# Patient Record
Sex: MALE | Race: WHITE | NOT HISPANIC OR LATINO | Employment: OTHER | URBAN - METROPOLITAN AREA
[De-identification: names, ages, dates, MRNs, and addresses within clinical notes are randomized per-mention and may not be internally consistent; named-entity substitution may affect disease eponyms.]

---

## 2018-08-01 ENCOUNTER — HISTORICAL (OUTPATIENT)
Dept: ADMINISTRATIVE | Facility: HOSPITAL | Age: 83
End: 2018-08-01

## 2022-07-13 DIAGNOSIS — E03.9 HYPOTHYROIDISM, UNSPECIFIED TYPE: Primary | ICD-10-CM

## 2022-07-13 RX ORDER — LEVOTHYROXINE SODIUM 100 UG/1
100 TABLET ORAL EVERY MORNING
COMMUNITY
Start: 2022-04-14 | End: 2022-07-13 | Stop reason: SDUPTHER

## 2022-07-13 RX ORDER — LEVOTHYROXINE SODIUM 100 UG/1
100 TABLET ORAL EVERY MORNING
Qty: 90 TABLET | Refills: 1 | Status: SHIPPED | OUTPATIENT
Start: 2022-07-13 | End: 2022-08-03 | Stop reason: SDUPTHER

## 2022-08-03 DIAGNOSIS — E03.9 HYPOTHYROIDISM, UNSPECIFIED TYPE: ICD-10-CM

## 2022-08-03 DIAGNOSIS — I10 HYPERTENSION, UNSPECIFIED TYPE: Primary | ICD-10-CM

## 2022-08-03 RX ORDER — LOSARTAN POTASSIUM 50 MG/1
50 TABLET ORAL DAILY
COMMUNITY
End: 2022-08-03 | Stop reason: SDUPTHER

## 2022-08-03 RX ORDER — LEVOTHYROXINE SODIUM 100 UG/1
100 TABLET ORAL EVERY MORNING
Qty: 90 TABLET | Refills: 1 | Status: SHIPPED | OUTPATIENT
Start: 2022-08-03 | End: 2022-08-03 | Stop reason: CLARIF

## 2022-08-03 RX ORDER — LOSARTAN POTASSIUM 50 MG/1
50 TABLET ORAL DAILY
Qty: 90 TABLET | Refills: 1 | Status: SHIPPED | OUTPATIENT
Start: 2022-08-03 | End: 2023-01-25

## 2023-02-06 ENCOUNTER — TELEPHONE (OUTPATIENT)
Dept: INTERNAL MEDICINE | Facility: CLINIC | Age: 88
End: 2023-02-06
Payer: MEDICARE

## 2023-02-06 RX ORDER — ROSUVASTATIN CALCIUM 40 MG/1
40 TABLET, COATED ORAL DAILY
COMMUNITY
Start: 2022-12-12 | End: 2023-12-21 | Stop reason: ALTCHOICE

## 2023-02-06 RX ORDER — FINASTERIDE 5 MG/1
5 TABLET, FILM COATED ORAL DAILY
COMMUNITY
Start: 2023-01-11 | End: 2023-12-21

## 2023-02-06 NOTE — TELEPHONE ENCOUNTER
----- Message from Nicolasa Mei sent at 2/6/2023  8:59 AM CST -----  Regarding: call about chol meds  Daughter Debra wants to discuss patient chol meds with you. She says he is on two. Call her at [phone]

## 2023-03-30 DIAGNOSIS — Z79.899 ENCOUNTER FOR LONG-TERM (CURRENT) USE OF OTHER MEDICATIONS: ICD-10-CM

## 2023-03-30 DIAGNOSIS — R30.0 DYSURIA: ICD-10-CM

## 2023-03-30 DIAGNOSIS — E78.2 MIXED HYPERLIPIDEMIA: ICD-10-CM

## 2023-03-30 DIAGNOSIS — E55.9 VITAMIN D DEFICIENCY: ICD-10-CM

## 2023-03-30 DIAGNOSIS — Z00.00 WELLNESS EXAMINATION: Primary | ICD-10-CM

## 2023-03-30 DIAGNOSIS — E03.8 OTHER SPECIFIED HYPOTHYROIDISM: ICD-10-CM

## 2023-04-07 LAB
CHOLEST SERPL-MSCNC: 150 MG/DL (ref 0–200)
HDLC SERPL-MCNC: 57 MG/DL (ref 35–70)
LDLC SERPL CALC-MCNC: 70 MG/DL (ref 0–160)
TRIGL SERPL-MCNC: 114 MG/DL (ref 40–160)

## 2023-04-17 ENCOUNTER — TELEPHONE (OUTPATIENT)
Dept: INTERNAL MEDICINE | Facility: CLINIC | Age: 88
End: 2023-04-17
Payer: MEDICARE

## 2023-04-25 ENCOUNTER — TELEPHONE (OUTPATIENT)
Dept: INTERNAL MEDICINE | Facility: CLINIC | Age: 88
End: 2023-04-25
Payer: MEDICARE

## 2023-04-25 DIAGNOSIS — R53.1 GENERALIZED WEAKNESS: Primary | ICD-10-CM

## 2023-04-25 NOTE — TELEPHONE ENCOUNTER
Please call Back patient is daughter about this issue  When is his next regular scheduled appointment   Question about home health what criteria would he have her needing home health nursing

## 2023-04-25 NOTE — TELEPHONE ENCOUNTER
Spoke with Nia Sams pt daughter, concerns about memory loss, also concerns about pts thoughts and random statements. Daughter also concerned about pt not wanting to go to any doctors appt. Pt does not like leaving his home for anything. Daughter states that the pt is stating something about conspiracy and someone out to get him would like to know about blood work abnormalities. Should pt have HH nurse.

## 2023-04-25 NOTE — TELEPHONE ENCOUNTER
Pt had an appt on yesterday and refused to come, also had an appt with a different doctor and refused to attend appt. Daughter is wanting home health because she does not want pt to not be seen by a provider or have his health issues missed due to him not wanting to go to any appts. Also daughter stated due to pts size she is unable to make him do anything.

## 2023-04-25 NOTE — TELEPHONE ENCOUNTER
----- Message from Za Kendall sent at 4/25/2023  8:55 AM CDT -----  .Type:  Needs Medical Advice    Who Called: pt daughter   Symptoms (please be specific):    How long has patient had these symptoms:    Pharmacy name and phone #:    Would the patient rather a call back or a response via [facility]?   Best Call Back Number: [phone]  Additional Information: pt daughter asking for a call back to talk about dad health

## 2023-05-04 RX ORDER — ASPIRIN 81 MG/1
81 TABLET ORAL DAILY
COMMUNITY

## 2023-05-04 RX ORDER — LATANOPROST 50 UG/ML
1 SOLUTION/ DROPS OPHTHALMIC NIGHTLY
COMMUNITY
Start: 2023-03-27

## 2023-05-09 ENCOUNTER — OFFICE VISIT (OUTPATIENT)
Dept: VASCULAR SURGERY | Facility: CLINIC | Age: 88
End: 2023-05-09
Payer: MEDICARE

## 2023-05-09 VITALS
SYSTOLIC BLOOD PRESSURE: 147 MMHG | HEIGHT: 75 IN | DIASTOLIC BLOOD PRESSURE: 76 MMHG | BODY MASS INDEX: 27.35 KG/M2 | HEART RATE: 67 BPM | WEIGHT: 220 LBS

## 2023-05-09 DIAGNOSIS — I65.23 BILATERAL CAROTID ARTERY OCCLUSION: Primary | ICD-10-CM

## 2023-05-09 PROCEDURE — 99213 PR OFFICE/OUTPT VISIT, EST, LEVL III, 20-29 MIN: ICD-10-PCS | Mod: ,,, | Performed by: SPECIALIST

## 2023-05-09 PROCEDURE — 99213 OFFICE O/P EST LOW 20 MIN: CPT | Mod: ,,, | Performed by: SPECIALIST

## 2023-05-30 ENCOUNTER — TELEPHONE (OUTPATIENT)
Dept: INTERNAL MEDICINE | Facility: CLINIC | Age: 88
End: 2023-05-30
Payer: MEDICARE

## 2023-06-05 ENCOUNTER — OFFICE VISIT (OUTPATIENT)
Dept: INTERNAL MEDICINE | Facility: CLINIC | Age: 88
End: 2023-06-05
Payer: MEDICARE

## 2023-06-05 VITALS
BODY MASS INDEX: 27.15 KG/M2 | HEART RATE: 68 BPM | WEIGHT: 218.38 LBS | SYSTOLIC BLOOD PRESSURE: 136 MMHG | DIASTOLIC BLOOD PRESSURE: 74 MMHG | OXYGEN SATURATION: 99 % | HEIGHT: 75 IN

## 2023-06-05 DIAGNOSIS — M19.90 ARTHRITIS: Chronic | ICD-10-CM

## 2023-06-05 DIAGNOSIS — E03.9 HYPOTHYROIDISM, UNSPECIFIED TYPE: Chronic | ICD-10-CM

## 2023-06-05 DIAGNOSIS — Z00.00 WELLNESS EXAMINATION: Primary | ICD-10-CM

## 2023-06-05 DIAGNOSIS — E78.5 HYPERLIPIDEMIA, UNSPECIFIED HYPERLIPIDEMIA TYPE: Chronic | ICD-10-CM

## 2023-06-05 DIAGNOSIS — I10 ESSENTIAL (PRIMARY) HYPERTENSION: Chronic | ICD-10-CM

## 2023-06-05 PROCEDURE — G0439 PR MEDICARE ANNUAL WELLNESS SUBSEQUENT VISIT: ICD-10-PCS | Mod: ,,, | Performed by: INTERNAL MEDICINE

## 2023-06-05 PROCEDURE — G0439 PPPS, SUBSEQ VISIT: HCPCS | Mod: ,,, | Performed by: INTERNAL MEDICINE

## 2023-06-05 NOTE — PROGRESS NOTES
Brett Taveras MD        PATIENT NAME: Rajesh Quintanilla  : 1/15/1934  DATE: 23  MRN: [id number]      Patient Care Team:  Brett Taveras MD as PCP - General (Internal Medicine)  MD Chriss Bravo Jr., MD as Consulting Physician (Cardiology)       Billing Provider: Brett Taveras MD  Level of Service: [place] MEDIC[place] ANNUAL WELLNESS SUBSEQUENT VISIT  Patient PCP Information       Provider PCP Type    Brett Taveras MD General            Reason for Visit / Chief Complaint: Medicare AWV (Wellness/)       Update PCP  Update Chief Complaint         History of Present Illness / Problem Focused Workflow     Rajesh Quintanilla presents to the clinic with Medicare AWV (Wellness/)     Brigitte is here for his annual medical wellness exam   He is doing well with no issues   Slight memory problems  He has had a fall a 2 with no injuries.      Review of Systems   Review of Systems   Constitutional: Negative.    HENT: Negative.     Eyes: Negative.    Respiratory: Negative.     Cardiovascular: Negative.    Gastrointestinal: Negative.    Endocrine: Negative.    Genitourinary: Negative.    Musculoskeletal: Negative.    Integumentary:  Negative.   Neurological: Negative.    Psychiatric/Behavioral: Negative.        Patient Reported Health Risk Assessment       Medical / Social / Family History     Past Medical History:   Diagnosis Date    Arthritis     Essential (primary) hypertension     Hyperlipidemia     Hypothyroidism, unspecified        History reviewed. No pertinent surgical history.    Social History  Mr. Quintanilla  reports that he has quit smoking. His smoking use included cigarettes. He has never used smokeless tobacco. He reports current alcohol use. He reports that he does not use drugs.    Family History  Mr.'s Quintanilla  family history includes Heart disease in his mother; Stroke in his father.        Medications and Allergies     Medications  Outpatient Medications Marked as Taking for the 23  encounter (Office Visit) with Brett Taveras MD   Medication Sig Dispense Refill    aspirin (ECOTRIN) 81 MG EC tablet Take 81 mg by mouth once daily.      latanoprost 0.005 % ophthalmic solution Place 1 drop into both eyes every evening.      losartan (COZAAR) 50 MG tablet TAKE 1 TABLET BY MOUTH ONCE DAILY 90 tablet 1    rosuvastatin (CRESTOR) 40 MG Tab Take 40 mg by mouth Daily.         Allergies  Review of patient's allergies indicates:  No Known Allergies    Physical Examination     Vitals:    06/05/23 0955   BP: 136/74   Pulse: 68     Physical Exam  Vitals reviewed.   Constitutional:       Appearance: Normal appearance.   HENT:      Head: Normocephalic.      Right Ear: Tympanic membrane normal.      Left Ear: Tympanic membrane normal.      Nose: Nose normal.      Mouth/Throat:      Pharynx: Oropharynx is clear.   Eyes:      Extraocular Movements: Extraocular movements intact.      Pupils: Pupils are equal, round, and reactive to light.   Cardiovascular:      Rate and Rhythm: Normal rate and regular rhythm.      Pulses: Normal pulses.      Heart sounds: Normal heart sounds.   Pulmonary:      Effort: Pulmonary effort is normal.      Breath sounds: Normal breath sounds.   Abdominal:      General: Abdomen is flat. Bowel sounds are normal.      Palpations: Abdomen is soft.   Musculoskeletal:         General: Normal range of motion.      Cervical back: Normal range of motion.   Skin:     General: Skin is warm and dry.   Neurological:      General: No focal deficit present.      Mental Status: He is alert and oriented to person, place, and time.   Psychiatric:         Mood and Affect: Mood normal.         Behavior: Behavior normal.        No flowsheet data found.  Fall Risk Assessment - Outpatient 6/5/2023 5/9/2023   Mobility Status Ambulatory Ambulatory   Number of falls 0 0   Identified as fall risk 0 0                Assessment and Plan (including Health Maintenance)      Problem List  Smart Sets  Document  Outside HM   :    Plan:   Wellness examination    Hyperlipidemia, unspecified hyperlipidemia type    Hypothyroidism, unspecified type    Arthritis    Essential (primary) hypertension       All labs stable   Continue medication   Revisit 1 year annual wellness.         Health Maintenance Due   Topic Date Due    TETANUS VACCINE  Never done    Shingles Vaccine (1 of 2) Never done    Pneumococcal Vaccines (Age 65+) (1 - PCV) Never done    COVID-19 Vaccine (4 - Mixed Product series) 02/13/2022       Problem List Items Addressed This Visit          Cardiac/Vascular    Essential (primary) hypertension (Chronic)    Hyperlipidemia (Chronic)       Endocrine    Hypothyroidism, unspecified (Chronic)       Orthopedic    Arthritis (Chronic)     Other Visit Diagnoses       Wellness examination    -  Primary            Health Maintenance Topics with due status: Not Due       Topic Last Completion Date    Influenza Vaccine 12/06/2021    Lipid Panel 04/07/2023       Future Appointments   Date Time Provider Department Center   5/9/2024  9:00 AM CV [facility] VASCULAR SURGERY  [address] [facility]   5/9/2024  9:30 AM Page Hernandez MD [facility] [place] Annual Wellness and Personalized Prevention Plan:   Fall Risk + Home Safety + Hearing Impairment + Depression Screen + Cognitive Impairment Screen + Health Risk Assessment all reviewed.         Advance Care Planning   I attest to discussing Advance Care Planning with patient and/or family member.  Education was provided including the importance of the Health Care Power of , Advance Directives, and/or [place] documentation.  The patient expressed understanding to the importance of this information and discussion.       Opioid Screening: Patient medication list reviewed, patient is not taking prescription opioids. Patient is not using additional opioids than prescribed. Patient is at low risk of substance abuse based on this opioid use history.         Signature:  Brett Lowery MD  [facility]  [address]  [place] [address]    Date of encounter: 6/5/23    Follow up in about 1 year (around 6/5/2024) for Medicare Wellness with labs. In addition to their scheduled follow up, the patient has also been instructed to follow up on as needed basis.

## 2023-07-10 ENCOUNTER — TELEPHONE (OUTPATIENT)
Dept: INTERNAL MEDICINE | Facility: CLINIC | Age: 88
End: 2023-07-10
Payer: MEDICARE

## 2023-07-10 DIAGNOSIS — F03.90 DEMENTIA, UNSPECIFIED DEMENTIA SEVERITY, UNSPECIFIED DEMENTIA TYPE, UNSPECIFIED WHETHER BEHAVIORAL, PSYCHOTIC, OR MOOD DISTURBANCE OR ANXIETY: Primary | ICD-10-CM

## 2023-07-10 NOTE — TELEPHONE ENCOUNTER
----- Message from Randi Louie sent at 7/7/2023  8:12 AM CDT -----  .Type:  Needs Medical Advice    Who Called: pt daughter   Symptoms (please be specific): memory loss   How long has patient had these symptoms:  6 months   Pharmacy name and phone #:  [place] [facility] [place]  Would the patient rather a call back or a response via [facility]? Call back   Best Call Back Number: [phone]  Additional Information: pt daughters is requesting in home care for memory loss need call back

## 2023-07-10 NOTE — TELEPHONE ENCOUNTER
Pt daughter called and stated that his dementia is getting worse she stated he's more confused, she would like to know if home health would be able to go out and help him with strategies to help improve memory. Please Advise....

## 2023-07-11 DIAGNOSIS — R41.3 MEMORY CHANGES: Primary | ICD-10-CM

## 2023-07-11 NOTE — TELEPHONE ENCOUNTER
Yes we could refer to Home Health please see if [facility] goes to [place] and they could be consulted.

## 2023-07-11 NOTE — TELEPHONE ENCOUNTER
Called and spoke with daughter. Let her know referral and information was sent. Gave her phone number also. [phone]

## 2023-07-25 DIAGNOSIS — I10 HYPERTENSION, UNSPECIFIED TYPE: ICD-10-CM

## 2023-07-25 RX ORDER — LOSARTAN POTASSIUM 50 MG/1
TABLET ORAL
Qty: 90 TABLET | Refills: 1 | Status: SHIPPED | OUTPATIENT
Start: 2023-07-25 | End: 2024-01-29

## 2023-08-22 ENCOUNTER — TELEPHONE (OUTPATIENT)
Dept: INTERNAL MEDICINE | Facility: CLINIC | Age: 88
End: 2023-08-22
Payer: MEDICARE

## 2023-08-22 NOTE — TELEPHONE ENCOUNTER
----- Message from Za Kendall sent at 8/22/2023  1:45 PM CDT -----  .Type:  Needs Medical Advice    Who Called: pt daughter  Symptoms (please be specific):    How long has patient had these symptoms:    Pharmacy name and phone #:    Would the patient rather a call back or a response via [facility]?   Best Call Back Number:   Additional Information: the cardiologist asking for his recent labs Dr. Chriss Fragoso please fax to [facility]

## 2023-11-21 ENCOUNTER — TELEPHONE (OUTPATIENT)
Dept: INTERNAL MEDICINE | Facility: CLINIC | Age: 88
End: 2023-11-21
Payer: MEDICARE

## 2023-11-21 DIAGNOSIS — N39.0 URINARY TRACT INFECTION WITHOUT HEMATURIA, SITE UNSPECIFIED: Primary | ICD-10-CM

## 2023-11-21 LAB
APPEARANCE UR: CLEAR
BACTERIA #/AREA URNS HPF: NORMAL /[HPF]
BILIRUB UR QL STRIP: NEGATIVE
COLOR UR: YELLOW
CRYSTALS URNS MICRO: NORMAL
EPI CELLS #/AREA URNS HPF: NORMAL /HPF (ref 0–10)
GLUCOSE UR QL STRIP: NEGATIVE
HGB UR QL STRIP: NEGATIVE
KETONES UR QL STRIP: NEGATIVE
LEUKOCYTE ESTERASE UR QL STRIP: NEGATIVE
MICRO URNS: NORMAL
MICRO URNS: NORMAL
NITRITE UR QL STRIP: NEGATIVE
PH UR STRIP: 5.5 [PH] (ref 5–7.5)
PROT UR QL STRIP: NORMAL
RBC #/AREA URNS HPF: NORMAL /HPF (ref 0–2)
SP GR UR STRIP: 1.02 (ref 1–1.03)
URINALYSIS REFLEX: NORMAL
UROBILINOGEN UR STRIP-MCNC: 1 MG/DL (ref 0.2–1)
WBC #/AREA URNS HPF: NORMAL /HPF (ref 0–5)

## 2023-11-21 NOTE — TELEPHONE ENCOUNTER
----- Message from Almaz Chery sent at 11/21/2023  8:08 AM CST -----  Regarding: Medical Advice  Type:  Needs Medical Advice    Who Called: Russ  Symptoms (please be specific): confusion, delusion....think possible UTI   How long has patient had these symptoms:  2days  Pharmacy name and phone #:    Would the patient rather a call back or a response via [facility]? Call back   Best Call Back Number: [phone]  Additional Information: Russ is requesting a call back to discuss Jovan

## 2023-11-21 NOTE — TELEPHONE ENCOUNTER
Spoke with pt daughter Russ and she's stating that pt has been increased confusion she stated pt may have a UTI, orders placed for U/A and faxed to [facility] in [place].

## 2023-11-27 ENCOUNTER — TELEPHONE (OUTPATIENT)
Dept: INTERNAL MEDICINE | Facility: CLINIC | Age: 88
End: 2023-11-27
Payer: MEDICARE

## 2023-11-27 DIAGNOSIS — I10 ESSENTIAL (PRIMARY) HYPERTENSION: Chronic | ICD-10-CM

## 2023-11-27 DIAGNOSIS — E78.5 HYPERLIPIDEMIA, UNSPECIFIED HYPERLIPIDEMIA TYPE: Chronic | ICD-10-CM

## 2023-11-27 DIAGNOSIS — Z00.00 WELLNESS EXAMINATION: Primary | ICD-10-CM

## 2023-11-27 NOTE — TELEPHONE ENCOUNTER
Spoke with Russ about his change in behavior level of consciousness  Began after a dental procedure with minor anesthesia   Suspect worsening of dementia or possibly TIA CVA   Blood work scheduled   MRI scheduled   Please schedule patient to see Johnson within the week for follow-up.

## 2023-11-27 NOTE — TELEPHONE ENCOUNTER
Pt daughter would like to discuss with you his health issues with him having dementia, daughter Russ would like to discuss with you without pt because pt gets very upset. She wants to know if there would be another way she can speak with you without letting her dad know just to get in sight and to go into detail .Please Advise......  Russ [phone] Pt daughter.

## 2023-11-27 NOTE — TELEPHONE ENCOUNTER
----- Message from Jeanette Horner sent at 11/27/2023  8:16 AM CST -----  .Type:  Patient Returning Call    Who Called:pt daughter Russ  Who Left Message for Patient:  Does the patient know what this is regarding?:discuss with doctor changes in the pt   Would the patient rather a call back or a response via [facility]? Call back   Best Call Back Number:[phone]  Additional Information: Pt's daughter states that she got the results back from the urine test and they were normal. She states that she wants to set up a meeting with the doctor to talk about the next step because she thinks that her dad might have a case of dementia.

## 2023-11-28 ENCOUNTER — TELEPHONE (OUTPATIENT)
Dept: INTERNAL MEDICINE | Facility: CLINIC | Age: 88
End: 2023-11-28
Payer: MEDICARE

## 2023-11-28 DIAGNOSIS — R41.3 OTHER AMNESIA: Primary | ICD-10-CM

## 2023-11-28 DIAGNOSIS — I10 ESSENTIAL (PRIMARY) HYPERTENSION: Chronic | ICD-10-CM

## 2023-11-28 DIAGNOSIS — E78.5 HYPERLIPIDEMIA, UNSPECIFIED HYPERLIPIDEMIA TYPE: Chronic | ICD-10-CM

## 2023-11-28 DIAGNOSIS — R41.3 MEMORY CHANGES: ICD-10-CM

## 2023-11-28 NOTE — TELEPHONE ENCOUNTER
----- Message from Randi Louie sent at 11/28/2023  2:44 PM CST -----  .Type:  Needs Medical Advice    Who Called: pt daughter  Russ   Symptoms (please be specific):    How long has patient had these symptoms:    Pharmacy name and phone #:    Would the patient rather a call back or a response via [facility]? Please call once it's sent over   Best Call Back Number: [phone]  Additional Information: pt daughter  is waiting to schedule her dads MRI , [facility] haven't received the orders yet

## 2023-11-28 NOTE — TELEPHONE ENCOUNTER
----- Message from Jeanette Horner sent at 11/28/2023 10:33 AM CST -----  .Type:  Patient Returning Call    Who Called:pt's daughter grant   Who Left Message for Patient:  Does the patient know what this is regarding?:scheduling mri   Would the patient rather a call back or a response via [facility]? Call back  Best Call Back Number:[phone]  Additional Information: pt's daughter states to please call her phone number when scheduling the mri   [phone]

## 2023-11-28 NOTE — TELEPHONE ENCOUNTER
Spoke with daughter she is calling [employer] to give them phone number to call her. He will not answer phone

## 2023-11-28 NOTE — TELEPHONE ENCOUNTER
Spoke with daughter. Sera will order the MRI and contact Dr. Taveras to see what labs he wants ordered.

## 2023-11-29 ENCOUNTER — LAB VISIT (OUTPATIENT)
Dept: LAB | Facility: HOSPITAL | Age: 88
End: 2023-11-29
Attending: INTERNAL MEDICINE
Payer: MEDICARE

## 2023-11-29 DIAGNOSIS — I10 ESSENTIAL (PRIMARY) HYPERTENSION: ICD-10-CM

## 2023-11-29 DIAGNOSIS — R41.3 MEMORY CHANGES: ICD-10-CM

## 2023-11-29 DIAGNOSIS — E78.5 HYPERLIPIDEMIA, UNSPECIFIED HYPERLIPIDEMIA TYPE: ICD-10-CM

## 2023-11-29 DIAGNOSIS — R41.3 OTHER AMNESIA: ICD-10-CM

## 2023-11-29 LAB
ALBUMIN SERPL-MCNC: 4.1 G/DL (ref 3.4–4.8)
ALBUMIN/GLOB SERPL: 1.4 RATIO (ref 1.1–2)
ALP SERPL-CCNC: 40 UNIT/L (ref 40–150)
ALT SERPL-CCNC: 23 UNIT/L (ref 0–55)
AST SERPL-CCNC: 27 UNIT/L (ref 5–34)
BASOPHILS # BLD AUTO: 0.03 X10(3)/MCL
BASOPHILS NFR BLD AUTO: 0.4 %
BILIRUB SERPL-MCNC: 0.7 MG/DL
BUN SERPL-MCNC: 17.7 MG/DL (ref 8.4–25.7)
CALCIUM SERPL-MCNC: 9.2 MG/DL (ref 8.8–10)
CHLORIDE SERPL-SCNC: 106 MMOL/L (ref 98–107)
CHOLEST SERPL-MCNC: 147 MG/DL
CHOLEST/HDLC SERPL: 3 {RATIO} (ref 0–5)
CO2 SERPL-SCNC: 27 MMOL/L (ref 23–31)
CREAT SERPL-MCNC: 1.06 MG/DL (ref 0.73–1.18)
EOSINOPHIL # BLD AUTO: 0.13 X10(3)/MCL (ref 0–0.9)
EOSINOPHIL NFR BLD AUTO: 1.9 %
ERYTHROCYTE [DISTWIDTH] IN BLOOD BY AUTOMATED COUNT: 13.1 % (ref 11.5–17)
ERYTHROCYTE [SEDIMENTATION RATE] IN BLOOD: 29 MM/HR (ref 0–15)
GFR SERPLBLD CREATININE-BSD FMLA CKD-EPI: >60 MLS/MIN/1.73/M2
GLOBULIN SER-MCNC: 2.9 GM/DL (ref 2.4–3.5)
GLUCOSE SERPL-MCNC: 101 MG/DL (ref 82–115)
HCT VFR BLD AUTO: 44.5 % (ref 42–52)
HDLC SERPL-MCNC: 56 MG/DL (ref 35–60)
HGB BLD-MCNC: 14.4 G/DL (ref 14–18)
IMM GRANULOCYTES # BLD AUTO: 0.02 X10(3)/MCL (ref 0–0.04)
IMM GRANULOCYTES NFR BLD AUTO: 0.3 %
LDLC SERPL CALC-MCNC: 74 MG/DL (ref 50–140)
LYMPHOCYTES # BLD AUTO: 1.08 X10(3)/MCL (ref 0.6–4.6)
LYMPHOCYTES NFR BLD AUTO: 15.4 %
MCH RBC QN AUTO: 31.5 PG (ref 27–31)
MCHC RBC AUTO-ENTMCNC: 32.4 G/DL (ref 33–36)
MCV RBC AUTO: 97.4 FL (ref 80–94)
MONOCYTES # BLD AUTO: 0.75 X10(3)/MCL (ref 0.1–1.3)
MONOCYTES NFR BLD AUTO: 10.7 %
NEUTROPHILS # BLD AUTO: 5.01 X10(3)/MCL (ref 2.1–9.2)
NEUTROPHILS NFR BLD AUTO: 71.3 %
NRBC BLD AUTO-RTO: 0 %
PLATELET # BLD AUTO: 163 X10(3)/MCL (ref 130–400)
PMV BLD AUTO: 10.7 FL (ref 7.4–10.4)
POTASSIUM SERPL-SCNC: 4.6 MMOL/L (ref 3.5–5.1)
PROT SERPL-MCNC: 7 GM/DL (ref 5.8–7.6)
RBC # BLD AUTO: 4.57 X10(6)/MCL (ref 4.7–6.1)
SODIUM SERPL-SCNC: 138 MMOL/L (ref 136–145)
TRIGL SERPL-MCNC: 85 MG/DL (ref 34–140)
VLDLC SERPL CALC-MCNC: 17 MG/DL
WBC # SPEC AUTO: 7.02 X10(3)/MCL (ref 4.5–11.5)

## 2023-11-29 PROCEDURE — 85025 COMPLETE CBC W/AUTO DIFF WBC: CPT

## 2023-11-29 PROCEDURE — 85652 RBC SED RATE AUTOMATED: CPT

## 2023-11-29 PROCEDURE — 36415 COLL VENOUS BLD VENIPUNCTURE: CPT

## 2023-11-29 PROCEDURE — 80053 COMPREHEN METABOLIC PANEL: CPT

## 2023-11-29 PROCEDURE — 80061 LIPID PANEL: CPT

## 2023-11-29 NOTE — TELEPHONE ENCOUNTER
----- Message from Idania Foss sent at 11/29/2023  8:11 AM CST -----  Regarding: Contact #  Good Morning  May I please get a cell number for Dr. Taveras should the radiologist need to consult with him on this STAT MRI.  The patient is coming in today.  Thanks

## 2023-11-30 ENCOUNTER — TELEPHONE (OUTPATIENT)
Dept: INTERNAL MEDICINE | Facility: CLINIC | Age: 88
End: 2023-11-30
Payer: MEDICARE

## 2023-11-30 NOTE — TELEPHONE ENCOUNTER
All laboratory reports were stable   As report earlier the MRI is fine.  Please relay these results to the patient in his daughter

## 2023-12-04 ENCOUNTER — TELEPHONE (OUTPATIENT)
Dept: INTERNAL MEDICINE | Facility: CLINIC | Age: 88
End: 2023-12-04
Payer: MEDICARE

## 2023-12-04 NOTE — TELEPHONE ENCOUNTER
----- Message from Raza Fajardo sent at 12/4/2023 12:49 PM CST -----  .Type:  Needs Medical Advice    Who Called: rebeca Solano's daughter   Symptoms (please be specific):    How long has patient had these symptoms:    Pharmacy name and phone #:    Would the patient rather a call back or a response via [facility]?   Best Call Back Number: [phone]  Additional Information: Requested a call back to speak with the nurse.

## 2023-12-13 ENCOUNTER — TELEPHONE (OUTPATIENT)
Dept: INTERNAL MEDICINE | Facility: CLINIC | Age: 88
End: 2023-12-13
Payer: MEDICARE

## 2023-12-21 ENCOUNTER — OFFICE VISIT (OUTPATIENT)
Dept: INTERNAL MEDICINE | Facility: CLINIC | Age: 88
End: 2023-12-21
Payer: MEDICARE

## 2023-12-21 VITALS
HEIGHT: 75 IN | HEART RATE: 75 BPM | OXYGEN SATURATION: 96 % | DIASTOLIC BLOOD PRESSURE: 74 MMHG | WEIGHT: 207 LBS | SYSTOLIC BLOOD PRESSURE: 120 MMHG | BODY MASS INDEX: 25.74 KG/M2

## 2023-12-21 DIAGNOSIS — Z23 FLU VACCINE NEED: Primary | ICD-10-CM

## 2023-12-21 DIAGNOSIS — E78.2 MIXED HYPERLIPIDEMIA: Chronic | ICD-10-CM

## 2023-12-21 DIAGNOSIS — F03.90 DEMENTIA, UNSPECIFIED DEMENTIA SEVERITY, UNSPECIFIED DEMENTIA TYPE, UNSPECIFIED WHETHER BEHAVIORAL, PSYCHOTIC, OR MOOD DISTURBANCE OR ANXIETY: ICD-10-CM

## 2023-12-21 PROCEDURE — 90694 VACC AIIV4 NO PRSRV 0.5ML IM: CPT | Mod: ,,, | Performed by: INTERNAL MEDICINE

## 2023-12-21 PROCEDURE — 90694 FLU VACCINE - QUADRIVALENT - ADJUVANTED: ICD-10-PCS | Mod: ,,, | Performed by: INTERNAL MEDICINE

## 2023-12-21 PROCEDURE — 99214 OFFICE O/P EST MOD 30 MIN: CPT | Mod: 25,,, | Performed by: INTERNAL MEDICINE

## 2023-12-21 PROCEDURE — G0008 FLU VACCINE - QUADRIVALENT - ADJUVANTED: ICD-10-PCS | Mod: ,,, | Performed by: INTERNAL MEDICINE

## 2023-12-21 PROCEDURE — G0008 ADMIN INFLUENZA VIRUS VAC: HCPCS | Mod: ,,, | Performed by: INTERNAL MEDICINE

## 2023-12-21 PROCEDURE — 99214 PR OFFICE/OUTPT VISIT, EST, LEVL IV, 30-39 MIN: ICD-10-PCS | Mod: 25,,, | Performed by: INTERNAL MEDICINE

## 2023-12-21 RX ORDER — MEMANTINE HYDROCHLORIDE 5 MG/1
5 TABLET ORAL 2 TIMES DAILY
Qty: 60 TABLET | Refills: 11 | Status: SHIPPED | OUTPATIENT
Start: 2023-12-21 | End: 2024-12-20

## 2023-12-21 NOTE — ASSESSMENT & PLAN NOTE
Begin Namenda 5 mg bid  Referral to neurology  Revisit 3 months  Mm E done he scored 12/30   Long discussion with daughters about dementia and what we have in store for further evaluation.

## 2023-12-21 NOTE — PROGRESS NOTES
Brett Taveras MD        PATIENT NAME: Rajesh Quintanilla  : 1/15/1934  DATE: 23  MRN: [id number]      Billing Provider: Brett Taveras MD  Level of Service: [place] OFFICE/OUTPT VISIT, EST, LEVL IV, 30-39 MIN  Patient PCP Information       Provider PCP Type    Brett Taveras MD General            Reason for Visit / Chief Complaint: Follow-up (Dementia/)       Update PCP  Update Chief Complaint         History of Present Illness / Problem Focused Workflow     Rajesh Quintanilla presents to the clinic with Follow-up (Dementia/)     M is here with his 2 daughters   He has had a significant change in his mental status over the last number of months   He is having visual and auditory hallucinations as well as significant loss of memory   He is healthy otherwise stable he has no underlying cardiovascular disease.        Review of Systems   Review of Systems   Constitutional: Negative.    HENT: Negative.     Eyes: Negative.    Respiratory: Negative.     Cardiovascular: Negative.    Gastrointestinal: Negative.    Endocrine: Negative.    Genitourinary: Negative.    Musculoskeletal: Negative.    Integumentary:  Negative.   Neurological: Negative.    Psychiatric/Behavioral: Negative.          Medical / Social / Family History     Past Medical History:   Diagnosis Date    Arthritis     Essential (primary) hypertension     Hyperlipidemia     Hypothyroidism, unspecified        History reviewed. No pertinent surgical history.    Social History  Mr. Quintanilla  reports that he has quit smoking. His smoking use included cigarettes. He has never used smokeless tobacco. He reports current alcohol use. He reports that he does not use drugs.    Family History  Mr.'s Quintanilla  family history includes Heart disease in his mother; Stroke in his father.    Medications and Allergies     Medications  Outpatient Medications Marked as Taking for the 23 encounter (Office Visit) with Brett Taveras MD   Medication Sig Dispense Refill     aspirin (ECOTRIN) 81 MG EC tablet Take 81 mg by mouth once daily.      latanoprost 0.005 % ophthalmic solution Place 1 drop into both eyes every evening.      losartan (COZAAR) 50 MG tablet TAKE 1 TABLET BY MOUTH ONCE DAILY 90 tablet 1    [DISCONTINUED] finasteride (PROSCAR) 5 mg tablet Take 5 mg by mouth Daily.      [DISCONTINUED] rosuvastatin (CRESTOR) 40 MG Tab Take 40 mg by mouth Daily.         Allergies  Review of patient's allergies indicates:  No Known Allergies    Physical Examination     Vitals:    12/21/23 1026   BP: 120/74   Pulse: 75     Physical Exam  Vitals reviewed.   Constitutional:       Appearance: Normal appearance.   HENT:      Head: Normocephalic.      Right Ear: Tympanic membrane normal.      Left Ear: Tympanic membrane normal.      Nose: Nose normal.      Mouth/Throat:      Pharynx: Oropharynx is clear.   Eyes:      Extraocular Movements: Extraocular movements intact.      Pupils: Pupils are equal, round, and reactive to light.   Cardiovascular:      Rate and Rhythm: Normal rate and regular rhythm.      Pulses: Normal pulses.      Heart sounds: Normal heart sounds.   Pulmonary:      Effort: Pulmonary effort is normal.      Breath sounds: Normal breath sounds.   Abdominal:      General: Abdomen is flat. Bowel sounds are normal.      Palpations: Abdomen is soft.   Genitourinary:     Testes: Normal.      Prostate: Normal.      Rectum: Normal.   Musculoskeletal:         General: Normal range of motion.      Cervical back: Normal range of motion.   Skin:     General: Skin is warm and dry.   Neurological:      General: No focal deficit present.      Mental Status: He is alert and oriented to person, place, and time.   Psychiatric:         Mood and Affect: Mood normal.         Behavior: Behavior normal.          Assessment and Plan (including Health Maintenance)      Problem List  Smart Sets  Document Outside HM   :    Plan:    ICD-10-CM ICD-9-CM   1. Dementia, unspecified dementia severity,  unspecified dementia type, unspecified whether behavioral, psychotic, or mood disturbance or anxiety  F03.90 294.20   2. Mixed hyperlipidemia  E78.2 272.2       Problem List Items Addressed This Visit          Neuro    Dementia - Primary (Chronic)     Begin Namenda 5 mg bid  Referral to neurology  Revisit 3 months  Mm E done he scored 12/30   Long discussion with daughters about dementia and what we have in store for further evaluation.            Cardiac/Vascular    Hyperlipidemia (Chronic)     Stop Crestor                   Health Maintenance Due   Topic Date Due    TETANUS VACCINE  Never done    Shingles Vaccine (1 of 2) Never done    RSV Vaccine (Age 60+ and Pregnant patients) (1 - 1-dose 60+ series) Never done    Pneumococcal Vaccines (Age 65+) (1 - PCV) Never done    Influenza Vaccine (1) 09/01/2023    COVID-19 Vaccine (4 - 2023-24 season) 09/01/2023       Problem List Items Addressed This Visit          Neuro    Dementia - Primary (Chronic)    Current Assessment & Plan     Begin Namenda 5 mg bid  Referral to neurology  Revisit 3 months  Mm E done he scored 12/30   Long discussion with daughters about dementia and what we have in store for further evaluation.            Cardiac/Vascular    Hyperlipidemia (Chronic)    Current Assessment & Plan     Stop Crestor            Health Maintenance Topics with due status: Not Due       Topic Last Completion Date    Lipid Panel 11/29/2023       Future Appointments   Date Time Provider Department Center   5/9/2024  9:00 AM CV [facility]   5/9/2024  9:40 AM [facility]   6/11/2024  8:40 AM Brett Taveras MD [place] [id number]        I spent a total of 50 minutes on the day of the visit.This includes face to face time and non-face to face time preparing to see the patient (eg, review of tests), obtaining and/or reviewing separately obtained history, documenting clinical information in  the electronic or other health record, independently interpreting results and communicating results to the patient/family/caregiver, or care coordinator.      Signature:  Brett Lowery MD  [facility]  [facility] [facility] [id number]    Date of encounter: 12/21/23

## 2024-01-04 ENCOUNTER — TELEPHONE (OUTPATIENT)
Dept: INTERNAL MEDICINE | Facility: CLINIC | Age: 89
End: 2024-01-04
Payer: MEDICARE

## 2024-01-04 DIAGNOSIS — F03.90 DEMENTIA, UNSPECIFIED DEMENTIA SEVERITY, UNSPECIFIED DEMENTIA TYPE, UNSPECIFIED WHETHER BEHAVIORAL, PSYCHOTIC, OR MOOD DISTURBANCE OR ANXIETY: Primary | Chronic | ICD-10-CM

## 2024-01-04 DIAGNOSIS — R41.3 MEMORY CHANGES: ICD-10-CM

## 2024-01-04 NOTE — TELEPHONE ENCOUNTER
Please reach out to the neurology clinic here as well as [facility] any availability also have them check to see if there is a neurologist in [place] we could you

## 2024-01-04 NOTE — TELEPHONE ENCOUNTER
Spoke with pt daughter and she stated that Jessica Morales doesn't have any availability until April and she stated her dad can't wait that long for an appt. Is there another provider you prefer for pt to see?

## 2024-01-04 NOTE — TELEPHONE ENCOUNTER
----- Message from Swati Willis sent at 1/4/2024 10:53 AM CST -----  .Type:  Patient Returning Call    Who Called:Russ  Who Left Message for Patient:daughter  Does the patient know what this is regarding?:neurologist  Would the patient rather a call back or a response via [facility]?   Best Call Back Number:[phone]  Additional Information: Please call back about referral to neurologist

## 2024-01-04 NOTE — TELEPHONE ENCOUNTER
Spoke with pt daughter Russ and she stated that any neurologist with an availability is fine will send internal referral first to get pt scheduled.

## 2024-01-09 ENCOUNTER — TELEPHONE (OUTPATIENT)
Dept: INTERNAL MEDICINE | Facility: CLINIC | Age: 89
End: 2024-01-09
Payer: MEDICARE

## 2024-01-09 DIAGNOSIS — F03.90 DEMENTIA, UNSPECIFIED DEMENTIA SEVERITY, UNSPECIFIED DEMENTIA TYPE, UNSPECIFIED WHETHER BEHAVIORAL, PSYCHOTIC, OR MOOD DISTURBANCE OR ANXIETY: Primary | ICD-10-CM

## 2024-01-09 DIAGNOSIS — R41.3 MEMORY CHANGES: ICD-10-CM

## 2024-01-09 NOTE — TELEPHONE ENCOUNTER
Spoke with Hope at  Dr Sylvie Baldwin's office. She took referral information and said she would contact daughter to make appt. They were able to schedule him way sooner than previous neuro's. She will contact daughter to make appt. Office note and information faxed to office.

## 2024-01-09 NOTE — TELEPHONE ENCOUNTER
----- Message from Christi Max sent at 1/9/2024  3:01 PM CST -----  Regarding: Referral  .Type:  Needs Medical Advice    Who Called: Pt's daughter, Russ  Symptoms (please be specific):    How long has patient had these symptoms:    Pharmacy name and phone #:    Would the patient rather a call back or a response via [facility]? Call back  Best Call Back Number: [phone]  Additional Information: All the neurosurgeons her dad was referral to can't see him for a while, she's concern and stated that her dad has dementia and is 89 yo, she doesn't think he can wait that long.

## 2024-01-29 DIAGNOSIS — I10 HYPERTENSION, UNSPECIFIED TYPE: ICD-10-CM

## 2024-01-29 RX ORDER — LOSARTAN POTASSIUM 50 MG/1
TABLET ORAL
Qty: 90 TABLET | Refills: 3 | Status: SHIPPED | OUTPATIENT
Start: 2024-01-29 | End: 2024-05-09

## 2024-04-22 RX ORDER — DONEPEZIL HYDROCHLORIDE 5 MG/1
TABLET, FILM COATED ORAL
COMMUNITY
Start: 2024-02-09

## 2024-05-01 ENCOUNTER — TELEPHONE (OUTPATIENT)
Dept: INTERNAL MEDICINE | Facility: CLINIC | Age: 89
End: 2024-05-01
Payer: MEDICARE

## 2024-05-01 NOTE — TELEPHONE ENCOUNTER
----- Message from Randi Louie sent at 5/1/2024 10:50 AM CDT -----  .Type:  Needs Medical Advice    Who Called: pt daughter Russ  Symptoms (please be specific): dizzy    How long has patient had these symptoms:  yesterday  Pharmacy name and phone #:  [place] [facility] [place], [address].  Would the patient rather a call back or a response via [facility]? Call back   Best Call Back Number: [phone]  Additional Information: b/p 96/49 last night but was good this morning, 140/70 please call , wanted  call

## 2024-05-01 NOTE — TELEPHONE ENCOUNTER
Spoke with pt daughter Russ and she stated understanding. Pt scheduled for visit on tomorrow at 3 pm.

## 2024-05-01 NOTE — TELEPHONE ENCOUNTER
Have him discontinue his blood pressure medicine today and tomorrow   Having come in have a 3:00 a.m. opening tomorrow afternoon.

## 2024-05-01 NOTE — TELEPHONE ENCOUNTER
Spoke with pt daughter and she stated that on yesterday pt was extremely dizzy and she decided to check his b/p, it was 96/49 which she states is extremely low for her dad. She also stated that pt hadn't drank any water at all yet that day so she made him drink 3 glasses of water and b/p was at 140/70 after. Pt daughter concerned because normal b/p has been running within the range of 103/52, she would like to know if we should lower b/p medication or schedule the pt to come in and see us for a visit . Please Advise...

## 2024-05-02 ENCOUNTER — OFFICE VISIT (OUTPATIENT)
Dept: INTERNAL MEDICINE | Facility: CLINIC | Age: 89
End: 2024-05-02
Payer: MEDICARE

## 2024-05-02 VITALS
BODY MASS INDEX: 25.74 KG/M2 | HEART RATE: 77 BPM | HEIGHT: 75 IN | DIASTOLIC BLOOD PRESSURE: 66 MMHG | OXYGEN SATURATION: 95 % | SYSTOLIC BLOOD PRESSURE: 124 MMHG | WEIGHT: 207 LBS

## 2024-05-02 DIAGNOSIS — I10 ESSENTIAL (PRIMARY) HYPERTENSION: Primary | Chronic | ICD-10-CM

## 2024-05-02 DIAGNOSIS — F02.A0 MILD LATE ONSET ALZHEIMER'S DEMENTIA WITHOUT BEHAVIORAL DISTURBANCE, PSYCHOTIC DISTURBANCE, MOOD DISTURBANCE, OR ANXIETY: ICD-10-CM

## 2024-05-02 DIAGNOSIS — G30.1 MILD LATE ONSET ALZHEIMER'S DEMENTIA WITHOUT BEHAVIORAL DISTURBANCE, PSYCHOTIC DISTURBANCE, MOOD DISTURBANCE, OR ANXIETY: ICD-10-CM

## 2024-05-02 DIAGNOSIS — M25.561 ACUTE PAIN OF RIGHT KNEE: ICD-10-CM

## 2024-05-02 PROCEDURE — 99214 OFFICE O/P EST MOD 30 MIN: CPT | Mod: ,,, | Performed by: INTERNAL MEDICINE

## 2024-05-02 RX ORDER — MUPIROCIN 20 MG/G
OINTMENT TOPICAL
COMMUNITY
Start: 2024-04-25

## 2024-05-02 RX ORDER — MELOXICAM 15 MG/1
15 TABLET ORAL
COMMUNITY
Start: 2024-05-02

## 2024-05-02 NOTE — PROGRESS NOTES
Brett Lowery MD        PATIENT NAME: Rajesh Quintanilla  : 1/15/1934  DATE: 24  MRN: [id number]      Billing Provider: Brett Lowery MD  Level of Service: [place] OFFICE/OUTPT VISIT, EST, LEVL IV, 30-39 MIN  Patient PCP Information       Provider PCP Type    Brett Lowery MD General            Reason for Visit / Chief Complaint: Blood Pressure medication       Update PCP  Update Chief Complaint         History of Present Illness / Problem Focused Workflow     Rajesh Quintanilla presents to the clinic with Blood Pressure medication     Nura is here evaluation of a few problems   First is daughter noted that his blood pressure has been low in the low side he has been having visual issues and weeks  He had an injection in his back yesterday for spinal stenosis   He also has significant right knee disease which he had an injection.        Review of Systems   Review of Systems   Constitutional: Negative.    HENT: Negative.     Eyes: Negative.    Respiratory: Negative.     Cardiovascular: Negative.    Gastrointestinal: Negative.    Endocrine: Negative.    Genitourinary: Negative.    Musculoskeletal: Negative.    Integumentary:  Negative.   Neurological: Negative.    Psychiatric/Behavioral: Negative.          Medical / Social / Family History     Past Medical History:   Diagnosis Date    Arthritis     Carotid artery stenosis     Hyperlipidemia     Thyroid disease        Past Surgical History:   Procedure Laterality Date    CATARACT EXTRACTION Bilateral 2015       Social History  Mr. Quintanilla  reports that he has quit smoking. His smoking use included cigarettes. He has never used smokeless tobacco. He reports current alcohol use. He reports that he does not use drugs.    Family History  Mr.'s Quintanilla  family history includes Heart disease in his mother; Stroke in his father.    Medications and Allergies     Medications  Current Outpatient Medications   Medication Sig Dispense Refill    aspirin (ECOTRIN) 81 MG EC  tablet Take 81 mg by mouth once daily.      donepeziL (ARICEPT) 5 MG tablet       latanoprost 0.005 % ophthalmic solution Place 1 drop into both eyes every evening.      losartan (COZAAR) 50 MG tablet TAKE 1 TABLET BY MOUTH ONCE DAILY 90 tablet 3    meloxicam (MOBIC) 15 MG tablet Take 15 mg by mouth.      memantine (NAMENDA) 5 MG Tab Take 1 tablet (5 mg total) by mouth 2 (two) times daily. 60 tablet 11    mupirocin (BACTROBAN) 2 % ointment SMARTSIG:Sparingly Topical 3 Times Daily       No current facility-administered medications for this visit.       Allergies  Review of patient's allergies indicates:  No Known Allergies    Physical Examination     Vitals:    05/02/24 1453   BP: 124/66   Pulse: 77     Physical Exam  Vitals reviewed.   Constitutional:       Appearance: Normal appearance.   HENT:      Head: Normocephalic.      Right Ear: Tympanic membrane normal.      Left Ear: Tympanic membrane normal.      Nose: Nose normal.      Mouth/Throat:      Pharynx: Oropharynx is clear.   Eyes:      Extraocular Movements: Extraocular movements intact.      Pupils: Pupils are equal, round, and reactive to light.   Cardiovascular:      Rate and Rhythm: Normal rate and regular rhythm.      Pulses: Normal pulses.      Heart sounds: Normal heart sounds.   Pulmonary:      Effort: Pulmonary effort is normal.      Breath sounds: Normal breath sounds.   Abdominal:      General: Abdomen is flat. Bowel sounds are normal.      Palpations: Abdomen is soft.   Musculoskeletal:         General: Normal range of motion.      Cervical back: Normal range of motion.      Comments: Swelling and decreased range of motion right knee   Skin:     General: Skin is warm and dry.   Neurological:      General: No focal deficit present.      Mental Status: He is alert and oriented to person, place, and time.   Psychiatric:         Mood and Affect: Mood normal.         Behavior: Behavior normal.          Assessment and Plan (including Health Maintenance)       Problem List  Smart Sets  Document Outside HM   :    Plan:    ICD-10-CM ICD-9-CM   1. Essential (primary) hypertension  I10 401.9   2. Acute pain of right knee  M25.561 719.46   3. Mild late onset Alzheimer's dementia without behavioral disturbance, psychotic disturbance, mood disturbance, or anxiety  G30.1 331.0    F02.A0 294.10       Problem List Items Addressed This Visit          Neuro    Mild late onset Alzheimer's dementia without behavioral disturbance, psychotic disturbance, mood disturbance, or anxiety     Marked improvement in mental status since beginning treatment            Cardiac/Vascular    Essential (primary) hypertension - Primary (Chronic)     Discontinue Cozaar and follow up here in the office in 2 weeks            Orthopedic    Acute pain of right knee     Referral for physical therapy of the knee                   Health Maintenance Due   Topic Date Due    TETANUS VACCINE  Never done    Shingles Vaccine (1 of 2) Never done    RSV Vaccine (Age 60+ and Pregnant patients) (1 - 1-dose 60+ series) Never done    Pneumococcal Vaccines (Age 65+) (1 of 1 - PCV) Never done    COVID-19 Vaccine (4 - 2023-24 season) 09/01/2023       Problem List Items Addressed This Visit          Neuro    Mild late onset Alzheimer's dementia without behavioral disturbance, psychotic disturbance, mood disturbance, or anxiety    Current Assessment & Plan     Marked improvement in mental status since beginning treatment            Cardiac/Vascular    Essential (primary) hypertension - Primary (Chronic)    Current Assessment & Plan     Discontinue Cozaar and follow up here in the office in 2 weeks            Orthopedic    Acute pain of right knee    Current Assessment & Plan     Referral for physical therapy of the knee            Health Maintenance Topics with due status: Not Due       Topic Last Completion Date    Lipid Panel 11/29/2023       Future Appointments   Date Time Provider Department Center   5/9/2024  9:00 AM  CV [facility] VASCULAR SURGERY  [address] [facility]   5/9/2024  9:40 AM Page Hernandez MD [facility]   6/28/2024 10:00 AM Brett Taveras MD [place] [id number]        I spent a total of 45 minutes on the day of the visit.This includes face to face time and non-face to face time preparing to see the patient (eg, review of tests), obtaining and/or reviewing separately obtained history, documenting clinical information in the electronic or other health record, independently interpreting results and communicating results to the patient/family/caregiver, or care coordinator.      Signature:  Brett Taveras MD  [facility]  [facility] [facility] [address]    Date of encounter: 5/2/24

## 2024-05-09 ENCOUNTER — OFFICE VISIT (OUTPATIENT)
Dept: VASCULAR SURGERY | Facility: CLINIC | Age: 89
End: 2024-05-09
Payer: MEDICARE

## 2024-05-09 VITALS
HEART RATE: 73 BPM | BODY MASS INDEX: 25.74 KG/M2 | SYSTOLIC BLOOD PRESSURE: 119 MMHG | HEIGHT: 75 IN | DIASTOLIC BLOOD PRESSURE: 70 MMHG | WEIGHT: 207 LBS

## 2024-05-09 DIAGNOSIS — I65.23 BILATERAL CAROTID ARTERY STENOSIS: Primary | ICD-10-CM

## 2024-05-09 PROCEDURE — 99213 OFFICE O/P EST LOW 20 MIN: CPT | Mod: ,,, | Performed by: SPECIALIST

## 2024-05-14 ENCOUNTER — OFFICE VISIT (OUTPATIENT)
Dept: INTERNAL MEDICINE | Facility: CLINIC | Age: 89
End: 2024-05-14
Payer: MEDICARE

## 2024-05-14 VITALS
DIASTOLIC BLOOD PRESSURE: 70 MMHG | HEIGHT: 75 IN | WEIGHT: 207 LBS | HEART RATE: 71 BPM | BODY MASS INDEX: 25.74 KG/M2 | OXYGEN SATURATION: 97 % | SYSTOLIC BLOOD PRESSURE: 122 MMHG

## 2024-05-14 DIAGNOSIS — M25.561 ACUTE PAIN OF RIGHT KNEE: ICD-10-CM

## 2024-05-14 DIAGNOSIS — I10 ESSENTIAL (PRIMARY) HYPERTENSION: Primary | Chronic | ICD-10-CM

## 2024-05-14 PROCEDURE — 99213 OFFICE O/P EST LOW 20 MIN: CPT | Mod: ,,, | Performed by: INTERNAL MEDICINE

## 2024-05-14 NOTE — PROGRESS NOTES
Brett Lowery MD        PATIENT NAME: Rajesh Quintanilla  : 1/15/1934  DATE: 24  MRN: [id number]      Billing Provider: Brett Lowery MD  Level of Service: [place] OFFICE/OUTPT VISIT, EST, LEVL III, 20-29 MIN  Patient PCP Information       Provider PCP Type    Brett Lowery MD General            Reason for Visit / Chief Complaint: Follow-up (BP)       Update PCP  Update Chief Complaint         History of Present Illness / Problem Focused Workflow     Rajesh Quintanilla presents to the clinic with Follow-up (BP)     Nura is here follow-up with blood pressure   Due to low blood pressure and symptomatology I discontinued his Cozaar 2 weeks ago   His home blood pressures have been doing fine and he is feeling better   He had a carotid artery evaluation which was stable        Review of Systems   Review of Systems   Constitutional: Negative.    HENT: Negative.     Eyes: Negative.    Respiratory: Negative.     Cardiovascular: Negative.    Gastrointestinal: Negative.    Endocrine: Negative.    Genitourinary: Negative.    Musculoskeletal: Negative.    Integumentary:  Negative.   Neurological: Negative.    Psychiatric/Behavioral: Negative.          Medical / Social / Family History     Past Medical History:   Diagnosis Date    Arthritis     Carotid artery stenosis     Dementia     Hyperlipidemia     Thyroid disease        Past Surgical History:   Procedure Laterality Date    CATARACT EXTRACTION Bilateral 2015    EYE SURGERY         Social History  Mr. Quintanilla  reports that he has quit smoking. His smoking use included cigarettes. He has never used smokeless tobacco. He reports current alcohol use. He reports that he does not use drugs.    Family History  Mr.'s Quintanilla  family history includes Cancer in his daughter; Heart disease in his mother; Hypertension in his father; Stroke in his father.    Medications and Allergies     Medications  Outpatient Medications Marked as Taking for the 24 encounter (Office  Visit) with Brett Taveras MD   Medication Sig Dispense Refill    aspirin (ECOTRIN) 81 MG EC tablet Take 81 mg by mouth once daily.      donepeziL (ARICEPT) 5 MG tablet       latanoprost 0.005 % ophthalmic solution Place 1 drop into both eyes every evening.      meloxicam (MOBIC) 15 MG tablet Take 15 mg by mouth.      memantine (NAMENDA) 5 MG Tab Take 1 tablet (5 mg total) by mouth 2 (two) times daily. 60 tablet 11       Allergies  Review of patient's allergies indicates:  No Known Allergies    Physical Examination     Vitals:    05/14/24 0958   BP: 122/70   Pulse: 71     Physical Exam  Vitals reviewed.   Constitutional:       Appearance: Normal appearance.   HENT:      Head: Normocephalic.      Right Ear: Tympanic membrane normal.      Left Ear: Tympanic membrane normal.      Nose: Nose normal.      Mouth/Throat:      Pharynx: Oropharynx is clear.   Eyes:      Extraocular Movements: Extraocular movements intact.      Pupils: Pupils are equal, round, and reactive to light.   Cardiovascular:      Rate and Rhythm: Normal rate and regular rhythm.      Pulses: Normal pulses.      Heart sounds: Normal heart sounds.   Pulmonary:      Effort: Pulmonary effort is normal.      Breath sounds: Normal breath sounds.   Abdominal:      General: Abdomen is flat. Bowel sounds are normal.      Palpations: Abdomen is soft.   Genitourinary:     Testes: Normal.      Prostate: Normal.      Rectum: Normal.   Musculoskeletal:         General: Normal range of motion.      Cervical back: Normal range of motion.   Skin:     General: Skin is warm and dry.   Neurological:      General: No focal deficit present.      Mental Status: He is alert and oriented to person, place, and time.   Psychiatric:         Mood and Affect: Mood normal.         Behavior: Behavior normal.          Assessment and Plan (including Health Maintenance)      Problem List  Smart Sets  Document Outside HM   :    Plan:    ICD-10-CM ICD-9-CM   1. Essential (primary)  hypertension  I10 401.9   2. Acute pain of right knee  M25.561 719.46       Problem List Items Addressed This Visit          Cardiac/Vascular    Essential (primary) hypertension - Primary (Chronic)     Blood pressure improved off Cozaar   He will continue off and monitor blood pressure.            Orthopedic    Acute pain of right knee     Physical therapy begun and he is doing well                   Health Maintenance Due   Topic Date Due    TETANUS VACCINE  Never done    Shingles Vaccine (1 of 2) Never done    RSV Vaccine (Age 60+ and Pregnant patients) (1 - 1-dose 60+ series) Never done    Pneumococcal Vaccines (Age 65+) (1 of 1 - PCV) Never done    COVID-19 Vaccine (4 - 2023-24 season) 09/01/2023       Problem List Items Addressed This Visit          Cardiac/Vascular    Essential (primary) hypertension - Primary (Chronic)    Current Assessment & Plan     Blood pressure improved off Cozaar   He will continue off and monitor blood pressure.            Orthopedic    Acute pain of right knee    Current Assessment & Plan     Physical therapy begun and he is doing well            Health Maintenance Topics with due status: Not Due       Topic Last Completion Date    Lipid Panel 11/29/2023    Aspirin/Antiplatelet Therapy 05/02/2024       Future Appointments   Date Time Provider Department Center   6/28/2024 10:00 AM Brett Taveras MD [place] [address]            Signature:  Brett Taveras MD  [facility]  [facility] [address]    Date of encounter: 5/14/24

## 2024-08-21 ENCOUNTER — TELEPHONE (OUTPATIENT)
Dept: INTERNAL MEDICINE | Facility: CLINIC | Age: 89
End: 2024-08-21
Payer: MEDICARE

## 2024-08-21 ENCOUNTER — HOSPITAL ENCOUNTER (EMERGENCY)
Facility: HOSPITAL | Age: 89
Discharge: HOME | End: 2024-08-21
Attending: EMERGENCY MEDICINE
Payer: MEDICARE

## 2024-08-21 VITALS
WEIGHT: 205 LBS | HEART RATE: 62 BPM | OXYGEN SATURATION: 96 % | DIASTOLIC BLOOD PRESSURE: 89 MMHG | SYSTOLIC BLOOD PRESSURE: 144 MMHG | BODY MASS INDEX: 25.49 KG/M2 | RESPIRATION RATE: 13 BRPM | TEMPERATURE: 98 F | HEIGHT: 75 IN

## 2024-08-21 DIAGNOSIS — I10 BENIGN ESSENTIAL HTN: ICD-10-CM

## 2024-08-21 DIAGNOSIS — R41.0 CONFUSION: ICD-10-CM

## 2024-08-21 DIAGNOSIS — F03.90 DEMENTIA, UNSPECIFIED DEMENTIA SEVERITY, UNSPECIFIED DEMENTIA TYPE, UNSPECIFIED WHETHER BEHAVIORAL, PSYCHOTIC, OR MOOD DISTURBANCE OR ANXIETY: ICD-10-CM

## 2024-08-21 DIAGNOSIS — E86.0 DEHYDRATION: Primary | ICD-10-CM

## 2024-08-21 LAB
ALBUMIN SERPL-MCNC: 3.8 G/DL (ref 3.4–4.8)
ALBUMIN/GLOB SERPL: 1.1 RATIO (ref 1.1–2)
ALP SERPL-CCNC: 54 UNIT/L (ref 40–150)
ALT SERPL-CCNC: 14 UNIT/L (ref 0–55)
ANION GAP SERPL CALC-SCNC: 8 MEQ/L
AST SERPL-CCNC: 22 UNIT/L (ref 5–34)
BACTERIA #/AREA URNS AUTO: ABNORMAL /HPF
BASOPHILS # BLD AUTO: 0.04 X10(3)/MCL
BASOPHILS NFR BLD AUTO: 0.6 %
BILIRUB SERPL-MCNC: 0.4 MG/DL
BILIRUB UR QL STRIP.AUTO: NEGATIVE
BUN SERPL-MCNC: 17.3 MG/DL (ref 8.4–25.7)
CALCIUM SERPL-MCNC: 9.3 MG/DL (ref 8.8–10)
CAOX CRY UR QL COMP ASSIST: ABNORMAL
CHLORIDE SERPL-SCNC: 108 MMOL/L (ref 98–111)
CLARITY UR: CLEAR
CO2 SERPL-SCNC: 25 MMOL/L (ref 23–31)
COLOR UR AUTO: YELLOW
CREAT SERPL-MCNC: 1.23 MG/DL (ref 0.73–1.18)
CREAT/UREA NIT SERPL: 14
EOSINOPHIL # BLD AUTO: 0.14 X10(3)/MCL (ref 0–0.9)
EOSINOPHIL NFR BLD AUTO: 1.9 %
ERYTHROCYTE [DISTWIDTH] IN BLOOD BY AUTOMATED COUNT: 13.1 % (ref 11.5–17)
GFR SERPLBLD CREATININE-BSD FMLA CKD-EPI: 56 ML/MIN/1.73/M2
GLOBULIN SER-MCNC: 3.4 GM/DL (ref 2.4–3.5)
GLUCOSE SERPL-MCNC: 111 MG/DL (ref 75–121)
GLUCOSE UR QL STRIP: NORMAL
HCT VFR BLD AUTO: 41.8 % (ref 42–52)
HGB BLD-MCNC: 14 G/DL (ref 14–18)
HGB UR QL STRIP: NEGATIVE
IMM GRANULOCYTES # BLD AUTO: 0.02 X10(3)/MCL (ref 0–0.04)
IMM GRANULOCYTES NFR BLD AUTO: 0.3 %
KETONES UR QL STRIP: NEGATIVE
LEUKOCYTE ESTERASE UR QL STRIP: NEGATIVE
LYMPHOCYTES # BLD AUTO: 1.27 X10(3)/MCL (ref 0.6–4.6)
LYMPHOCYTES NFR BLD AUTO: 17.5 %
MCH RBC QN AUTO: 32 PG (ref 27–31)
MCHC RBC AUTO-ENTMCNC: 33.5 G/DL (ref 33–36)
MCV RBC AUTO: 95.4 FL (ref 80–94)
MONOCYTES # BLD AUTO: 0.91 X10(3)/MCL (ref 0.1–1.3)
MONOCYTES NFR BLD AUTO: 12.6 %
MUCOUS THREADS URNS QL MICRO: ABNORMAL /LPF
NEUTROPHILS # BLD AUTO: 4.87 X10(3)/MCL (ref 2.1–9.2)
NEUTROPHILS NFR BLD AUTO: 67.1 %
NITRITE UR QL STRIP: NEGATIVE
NRBC BLD AUTO-RTO: 0 %
PH UR STRIP: 5.5 [PH]
PLATELET # BLD AUTO: 172 X10(3)/MCL (ref 130–400)
PMV BLD AUTO: 9.2 FL (ref 7.4–10.4)
POTASSIUM SERPL-SCNC: 4.4 MMOL/L (ref 3.5–5.1)
PROT SERPL-MCNC: 7.2 GM/DL (ref 5.8–7.6)
PROT UR QL STRIP: ABNORMAL
RBC # BLD AUTO: 4.38 X10(6)/MCL (ref 4.7–6.1)
RBC #/AREA URNS AUTO: ABNORMAL /HPF
SODIUM SERPL-SCNC: 141 MMOL/L (ref 136–145)
SP GR UR STRIP.AUTO: 1.02 (ref 1–1.03)
SQUAMOUS #/AREA URNS LPF: ABNORMAL /HPF
TROPONIN I SERPL-MCNC: 0.02 NG/ML (ref 0–0.04)
UROBILINOGEN UR STRIP-ACNC: NORMAL
WBC # BLD AUTO: 7.25 X10(3)/MCL (ref 4.5–11.5)
WBC #/AREA URNS AUTO: ABNORMAL /HPF

## 2024-08-21 PROCEDURE — 80053 COMPREHEN METABOLIC PANEL: CPT | Performed by: NURSE PRACTITIONER

## 2024-08-21 PROCEDURE — 84484 ASSAY OF TROPONIN QUANT: CPT | Performed by: EMERGENCY MEDICINE

## 2024-08-21 PROCEDURE — 99285 EMERGENCY DEPT VISIT HI MDM: CPT | Mod: 25

## 2024-08-21 PROCEDURE — 85025 COMPLETE CBC W/AUTO DIFF WBC: CPT | Performed by: NURSE PRACTITIONER

## 2024-08-21 PROCEDURE — 93005 ELECTROCARDIOGRAM TRACING: CPT

## 2024-08-21 PROCEDURE — 96360 HYDRATION IV INFUSION INIT: CPT

## 2024-08-21 PROCEDURE — 81001 URINALYSIS AUTO W/SCOPE: CPT | Performed by: NURSE PRACTITIONER

## 2024-08-21 PROCEDURE — 93010 ELECTROCARDIOGRAM REPORT: CPT | Mod: ,,, | Performed by: INTERNAL MEDICINE

## 2024-08-21 PROCEDURE — 63600175 PHARM REV CODE 636 W HCPCS: Performed by: EMERGENCY MEDICINE

## 2024-08-21 RX ADMIN — SODIUM CHLORIDE, POTASSIUM CHLORIDE, SODIUM LACTATE AND CALCIUM CHLORIDE 1000 ML: 600; 310; 30; 20 INJECTION, SOLUTION INTRAVENOUS at 07:08

## 2024-08-21 NOTE — FIRST PROVIDER EVALUATION
Medical screening examination initiated.  I have conducted a focused provider triage encounter, findings are as follows:    Brief history of present illness:  Patient's daughter states that patient has had confusion today. States that  patient has had diarrhea x awhile.     There were no vitals filed for this visit.    Pertinent physical exam:  Awake, alert, ambulatory      Brief workup plan:  Labs    Preliminary workup initiated; this workup will be continued and followed by the physician or advanced practice provider that is assigned to the patient when roomed.

## 2024-08-21 NOTE — TELEPHONE ENCOUNTER
----- Message from Henrique Bundy sent at 8/21/2024  2:30 PM CDT -----  .Who Called: Sadi Charles    Caller is requesting assistance/information from provider's office.    Symptoms (please be specific):  diarrhea, delirium   How long has patient had these symptoms:    List of preferred pharmacies on file (remove unneeded): [unfilled]  If different, enter pharmacy into here including location and phone number:       Preferred Method of Contact: Phone Call  Patient's Preferred Phone Number on File: [phone]   Best Call Back Number, if different:  Additional Information: called to inform PCP that they are going to hosp

## 2024-08-21 NOTE — TELEPHONE ENCOUNTER
Pt daughter called to inform Doc that the pt is currently in the hospital, she stated that  he's having some stomach issues, diarrhea, and he's very dehydrated.

## 2024-08-22 LAB
OHS QRS DURATION: 94 MS
OHS QTC CALCULATION: 412 MS

## 2024-08-22 NOTE — DISCHARGE INSTRUCTIONS
Try electrolyte  containing fluids, bland diet and follow up with his doctor. Return to the er for worsening as we discussed

## 2024-08-22 NOTE — ED PROVIDER NOTES
Encounter Date: 8/21/2024    SCRIBE #1 NOTE: I, Martha Morris, am scribing for, and in the presence of,  Rhina Gifford MD. I have scribed the following portions of the note - Other sections scribed: HPI, ROS, PE.       History     Chief Complaint   Patient presents with    Diarrhea    Altered Mental Status     Family reporting worsening confusion, starting today. Hx if dementia. Also reports diarrhea for 2 weeks, seen by PCP and did stool sample, awaiting results.     Patient is a 89-year-old male with a history of dementia presenting to the ED with c/o altered mental status. Per the pt's daughter who is bedside, the pt was recently on 2 rounds of antibiotics for pneumonia and COVID, with the last round ending approximately 3 weeks ago. She states that the pt has had diarrhea for the past 2 weeks that has been progressively improving. The pt saw his PCP in clinic for this where he had a stool sample done. The results of the sample have not yet been received. She notes that today the pt had increased confusion and bizarre behavior that was not consistent with his dementia symptoms. The pt reports fatigue but denies any other complaints.     The history is provided by the patient and a relative. No  was used.     Review of patient's allergies indicates:  No Known Allergies  History reviewed. No pertinent past medical history.  History reviewed. No pertinent surgical history.  No family history on file.     Review of Systems   Constitutional:  Positive for fatigue.   Gastrointestinal:  Positive for diarrhea.   Genitourinary:  Negative for urgency.   Psychiatric/Behavioral:  Positive for confusion.    All other systems reviewed and are negative.      Physical Exam     Initial Vitals [08/21/24 1605]   BP Pulse Resp Temp SpO2   128/78 64 18 97.6 °F (36.4 °C) 96 %      MAP       --         Physical Exam    Nursing note and vitals reviewed.  Constitutional: He appears well-developed and  well-nourished. He is not diaphoretic. No distress.   HENT:   Head: Normocephalic and atraumatic.   Nose: Nose normal.   Mouth/Throat: Oropharynx is clear and moist.   Eyes: Conjunctivae and EOM are normal. Pupils are equal, round, and reactive to light.   Neck: Trachea normal. Neck supple.   Normal range of motion.  Cardiovascular:  Normal rate, regular rhythm, normal heart sounds and intact distal pulses.     Exam reveals no gallop and no friction rub.       No murmur heard.  Pulmonary/Chest: Breath sounds normal. No respiratory distress. He has no wheezes. He has no rhonchi. He has no rales. He exhibits no tenderness.   Abdominal: Abdomen is soft. Bowel sounds are normal. He exhibits no distension and no mass. There is no abdominal tenderness. There is no rebound.   Musculoskeletal:         General: No tenderness or edema. Normal range of motion.      Cervical back: Normal range of motion and neck supple.      Lumbar back: Normal. No tenderness. Normal range of motion.     Neurological: He is alert and oriented to person, place, and time. He has normal strength. No cranial nerve deficit or sensory deficit.   The pt is pleasantly confused.    Skin: Skin is warm and dry. Capillary refill takes less than 2 seconds. No rash and no abscess noted. No erythema. No pallor.   Psychiatric: He has a normal mood and affect. His behavior is normal. Judgment and thought content normal.         ED Course   Procedures  Labs Reviewed   COMPREHENSIVE METABOLIC PANEL - Abnormal       Result Value    Sodium 141      Potassium 4.4      Chloride 108      CO2 25      Glucose 111      Blood Urea Nitrogen 17.3      Creatinine 1.23 (*)     Calcium 9.3      Protein Total 7.2      Albumin 3.8      Globulin 3.4      Albumin/Globulin Ratio 1.1      Bilirubin Total 0.4      ALP 54      ALT 14      AST 22      eGFR 56      Anion Gap 8.0      BUN/Creatinine Ratio 14     URINALYSIS, REFLEX TO URINE CULTURE - Abnormal    Color, UA Yellow       Appearance, UA Clear      Specific Gravity, UA 1.024      pH, UA 5.5      Protein, UA Trace (*)     Glucose, UA Normal      Ketones, UA Negative      Blood, UA Negative      Bilirubin, UA Negative      Urobilinogen, UA Normal      Nitrites, UA Negative      Leukocyte Esterase, UA Negative      RBC, UA 0-5      WBC, UA 0-5      Bacteria, UA None Seen      Squamous Epithelial Cells, UA Trace      Mucous, UA Trace (*)     Calcium Oxalate Crystals, UA Occasional (*)    CBC WITH DIFFERENTIAL - Abnormal    WBC 7.25      RBC 4.38 (*)     Hgb 14.0      Hct 41.8 (*)     MCV 95.4 (*)     MCH 32.0 (*)     MCHC 33.5      RDW 13.1      Platelet 172      MPV 9.2      Neut % 67.1      Lymph % 17.5      Mono % 12.6      Eos % 1.9      Basophil % 0.6      Lymph # 1.27      Neut # 4.87      Mono # 0.91      Eos # 0.14      Baso # 0.04      IG# 0.02      IG% 0.3      NRBC% 0.0     TROPONIN I - Normal    Troponin-I 0.019     CBC W/ AUTO DIFFERENTIAL    Narrative:     The following orders were created for panel order CBC Auto Differential.  Procedure                               Abnormality         Status                     ---------                               -----------         ------                     CBC with Differential[[id number]]       Abnormal            Final result                 Please view results for these tests on the individual orders.          Imaging Results              CT Head Without Contrast (Final result)  Result time 08/21/24 17:29:44      Final result by Ariadna Esparza MD (08/21/24 17:29:44)                   Impression:      No appreciable acute intracranial abnormality.      Electronically signed by: Ariadna Esparza  Date:    08/21/2024  Time:    17:29               Narrative:    EXAMINATION:  CT HEAD WITHOUT CONTRAST    CLINICAL HISTORY:  Mental status change, unknown cause;    TECHNIQUE:  Low dose axial CT images obtained throughout the head without intravenous contrast.  Axial, sagittal and  coronal reconstructions were performed and interpreted.    DLP: 1083 mGycm    All CT scans at this location are performed using dose optimization techniques as appropriate to a performed exam including the following automated exposure control, adjustment of the mA and/or kV according to patient size and/or use of iterative reconstruction technique    COMPARISON:  No relevant prior available for comparison.    FINDINGS:  BRAIN: Gray white differentiation is maintained. Moderate cerebral atrophy.  Mild periventricular and subcortical white matter changes most compatible with chronic small vessel ischemic disease.  Incidental choroidal fissure cyst.  No hemorrhage. No edema. No mass effect or midline shift.  The posterior fossa and midline structures are unremarkable.  There are atherosclerotic calcifications.    VENTRICLES: Ex vacuo dilatation of the ventricles.    EXTRA-AXIAL: No abnormal extra-axial collections.    BONES: Calvarium is intact.    SINUSES AND MASTOIDS: Visualized paranasal sinuses and mastoid air cells are clear.                                    X-Rays:   Independently Interpreted Readings:   Head CT: No hemorrhage.  No skull fracture.  No acute stroke.     Medications   lactated ringers bolus 1,000 mL (0 mLs Intravenous Stopped 8/21/24 2017)     Medical Decision Making  Differential diagnosis include but are not limited to: dehydration, UTI, and electrolyte abnormality.   Cbc, cmp, ua, ct head ordered and reviewed  Unremarkable  Given fluids, at baseline, offered admit but declined, dc with close pcp f/u    Problems Addressed:  Benign essential HTN: chronic illness or injury with exacerbation, progression, or side effects of treatment  Confusion: acute illness or injury that poses a threat to life or bodily functions  Dehydration: acute illness or injury that poses a threat to life or bodily functions  Dementia, unspecified dementia severity, unspecified dementia type, unspecified whether  behavioral, psychotic, or mood disturbance or anxiety: chronic illness or injury with exacerbation, progression, or side effects of treatment    Amount and/or Complexity of Data Reviewed  External Data Reviewed: notes.     Details: Outpt visits for dementia  Labs: ordered. Decision-making details documented in ED Course.  Radiology: ordered and independent interpretation performed. Decision-making details documented in ED Course.  ECG/medicine tests: ordered and independent interpretation performed.    Risk  OTC drugs.  Prescription drug management.  Decision regarding hospitalization.            Scribe Attestation:   Scribe #1: I performed the above scribed service and the documentation accurately describes the services I performed. I attest to the accuracy of the note.  Comments: Attending:   Physician Attestation Statement for Scribe #1: IRhina MD, personally performed the services described in this documentation. All medical record entries made by the scribe were at my direction and in my presence.  I have reviewed the chart and agree that the record reflects my personal performance and is accurate and complete.        Attending Attestation:           Physician Attestation for Scribe:  Physician Attestation Statement for Scribe #1: Rhina SPENCE MD, reviewed documentation, as scribed by Martha Caceres in my presence, and it is both accurate and complete.             ED Course as of 08/22/24 0117   Wed Aug 21, 2024   1914 [id number]--ciujxz chart [BS]   1938 The obtained at 7:22 p.m. rate of 60 possible ectopic atrial rhythm [BS]   2030 Tolerating po, offered obs admission but declined [BS]   2034 They feel comfortable with dc home, he is at his baseline [BS]      ED Course User Index  [BS] Rhina Gifford MD                           Clinical Impression:  Final diagnoses:  [R41.0] Confusion  [E86.0] Dehydration (Primary)  [F03.90] Dementia, unspecified dementia severity, unspecified  dementia type, unspecified whether behavioral, psychotic, or mood disturbance or anxiety  [I10] Benign essential HTN          ED Disposition Condition    Discharge Stable          ED Prescriptions    None       Follow-up Information       Follow up With Specialties Details Why Contact Info    his primary care doctor  Schedule an appointment as soon as possible for a visit       [facility] - Emergency Dept Emergency Medicine  As needed, If symptoms worsen [address] [place] [address]  [phone]             Rhina Gifford MD  08/22/24 0117

## 2025-07-15 ENCOUNTER — TELEPHONE (OUTPATIENT)
Dept: INTERNAL MEDICINE | Facility: CLINIC | Age: OVER 89
End: 2025-07-15
Payer: MEDICARE

## 2025-07-15 NOTE — TELEPHONE ENCOUNTER
Advised patient's daughter that a referral to home health or hospice cannot be made until the patient is seen. Daughter was crying and states that with his dementia it is extremely hard to get him out of the house. I advised her that we could do a virtual appointment. She said that works perfect. Got him scheduled.

## 2025-07-15 NOTE — TELEPHONE ENCOUNTER
Copied from CRM #[id number]. Topic: General Inquiry - Patient Advice  >> Jul 14, 2025  8:35 AM Candelaria wrote:  .Who Called: Russ (daughter)    Caller is requesting assistance/information from provider's office.    Symptoms (please be specific): N/A   How long has patient had these symptoms: N/A  List of preferred pharmacies on file (remove unneeded): [unfilled]  If different, enter pharmacy into here including location and phone number: N/A    Preferred Method of Contact: Phone Call    Patient's Preferred Phone Number on File: [phone]     Best Call Back Number, if different:    Additional Information: Called stating her pt was diagnosed two years ago with dementia and he's now not wanting to leave the house for anything, especially his appointments. Would like a cb from nurse to discuss home health options for pt. Please advise, thank you.

## 2025-07-17 ENCOUNTER — PATIENT MESSAGE (OUTPATIENT)
Dept: INTERNAL MEDICINE | Facility: CLINIC | Age: OVER 89
End: 2025-07-17
Payer: MEDICARE

## 2025-07-17 NOTE — TELEPHONE ENCOUNTER
Medical verbiage will have to be utilized.  She is encouraged to discuss with her father/family since transparency is key, how they handle discussing will remain with them and their family. We will try to be sensitive to her input on this circumstances.

## 2025-07-21 ENCOUNTER — OFFICE VISIT (OUTPATIENT)
Dept: INTERNAL MEDICINE | Facility: CLINIC | Age: OVER 89
End: 2025-07-21
Payer: MEDICARE

## 2025-07-21 VITALS — BODY MASS INDEX: 26.79 KG/M2 | HEIGHT: 76 IN | WEIGHT: 220 LBS

## 2025-07-21 DIAGNOSIS — I10 ESSENTIAL (PRIMARY) HYPERTENSION: Chronic | ICD-10-CM

## 2025-07-21 DIAGNOSIS — G47.00 INSOMNIA, UNSPECIFIED TYPE: ICD-10-CM

## 2025-07-21 DIAGNOSIS — E03.9 HYPOTHYROIDISM, UNSPECIFIED TYPE: Chronic | ICD-10-CM

## 2025-07-21 DIAGNOSIS — E78.2 MIXED HYPERLIPIDEMIA: Chronic | ICD-10-CM

## 2025-07-21 DIAGNOSIS — G30.1 MODERATE LATE ONSET ALZHEIMER'S DEMENTIA WITH PSYCHOTIC DISTURBANCE: Primary | Chronic | ICD-10-CM

## 2025-07-21 DIAGNOSIS — R54 FRAIL ELDERLY: ICD-10-CM

## 2025-07-21 DIAGNOSIS — F02.B2 MODERATE LATE ONSET ALZHEIMER'S DEMENTIA WITH PSYCHOTIC DISTURBANCE: Primary | Chronic | ICD-10-CM

## 2025-07-21 RX ORDER — TRAZODONE HYDROCHLORIDE 100 MG/1
100 TABLET ORAL NIGHTLY
COMMUNITY
Start: 2025-07-11

## 2025-07-21 RX ORDER — RISPERIDONE 0.25 MG/1
0.5 TABLET ORAL 2 TIMES DAILY
COMMUNITY
Start: 2025-07-10

## 2025-07-21 RX ORDER — ROSUVASTATIN CALCIUM 5 MG/1
5 TABLET, COATED ORAL DAILY
COMMUNITY
Start: 2025-07-10

## 2025-07-21 NOTE — PROGRESS NOTES
Patient ID: Rajesh Quintanilla is a 89 y.o. male.    Chief Complaint: Dementia (Dementia wants to set up HH. Patient is not sleeping even with increase of Trazodone. Daughter said Neurology is handling the trazadone.)    The patient location is:  Home  Visit type: audiovisual    Face to Face time with patient:  25 minutes  35 minutes of total time spent on the encounter, which includes face to face time and non-face to face time preparing to see the patient (eg, review of tests), Obtaining and/or reviewing separately obtained history, Documenting clinical information in the electronic or other health record, Independently interpreting results (not separately reported) and communicating results to the patient/family/caregiver, or Care coordination (not separately reported).     Each patient to whom he or she provides medical services by telemedicine is:  (1) informed of the relationship between the physician and patient and the respective role of any other health care provider with respect to management of the patient; and (2) notified that he or she may decline to receive medical services by telemedicine and may withdraw from such care at any time.    Notes:     Rajesh Quintanilla is a 89 y.o. male, known to Dr Taveras, presents today for a requested visit.  Medical comorbidities include HTN, HLD, hypothyroidism, Alzheimer's dementia and insomnia.    History of Present Illness    Patient presents today for evaluation and request of home health services.  Patient does have history moderate Alzheimer's dementia followed by Neurology.  Currently struggling with sleep disturbances.  Previously initiated on trazodone however, per daughter, no longer effective.  Psychiatric features currently being managed with Risperdal.  Interested in psychiatric referral for further evaluation of medication regimen.  Also history hypothyroidism, however no recent labs .  Discussed obtaining via home health service, for which daughter was in  agreeance to.  Otherwise stable appearing       Wellness:06/05/2023     MEDICAL HISTORY:    Past Medical History:   Diagnosis Date    Arthritis     Carotid artery stenosis     Dementia     Hyperlipidemia     Thyroid disease       Past Surgical History:   Procedure Laterality Date    CATARACT EXTRACTION Bilateral 04/14/2015    EYE SURGERY        Social History[1]       Health Maintenance Due   Topic Date Due    TETANUS VACCINE  Never done    Shingles Vaccine (1 of 2) Never done    RSV Vaccine (Age 60+ and Pregnant patients) (1 - 1-dose 75+ series) Never done    Pneumococcal Vaccines (Age 50+) (2 of 2 - PCV) 01/03/2023    COVID-19 Vaccine (4 - 2024-25 season) 09/01/2024          Patient Care Team:  Brett Taveras MD as PCP - General (Internal Medicine)  Chriss Fragoso Jr., MD as Consulting Physician (Cardiology)  Mark Bassett MD as Consulting Physician (Urology)  Brett Taveras MD (Internal Medicine)      Review of Systems   Constitutional:  Negative for activity change, fatigue, fever and unexpected weight change.   HENT:  Negative for congestion, hearing loss, rhinorrhea, sore throat and trouble swallowing.    Eyes:  Negative for discharge, redness and visual disturbance.   Respiratory:  Negative for cough, chest tightness, shortness of breath and wheezing.    Cardiovascular:  Negative for chest pain and palpitations.   Gastrointestinal:  Negative for abdominal pain, blood in stool, constipation, diarrhea, nausea and vomiting.   Endocrine: Negative for polydipsia and polyuria.   Genitourinary:  Negative for difficulty urinating, dysuria, flank pain, frequency, hematuria and urgency.   Musculoskeletal:  Negative for arthralgias, gait problem, joint swelling, myalgias and neck pain.   Skin:  Negative for rash and wound.   Neurological:  Negative for facial asymmetry, speech difficulty, weakness and headaches.   Psychiatric/Behavioral:  Positive for confusion and sleep disturbance. Negative for dysphoric  "mood.    All other systems reviewed and are negative.      Objective:   Ht 6' 4" (1.93 m)   Wt 99.8 kg (220 lb)   BMI 26.78 kg/m²      Physical Exam    No physical exam obtain due to telemedicine format    Assessment:       ICD-10-CM ICD-9-CM   1. 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 Insomnia, unspecified type  G47.00 780.52   3. Frail elderly  R54 797   4. Hypothyroidism, unspecified type  E03.9 244.9   5. Essential (primary) hypertension  I10 401.9   6. Mixed hyperlipidemia  E78.2 272.2        Plan:   1. 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  Assessment & Plan:  -progressive   -referral to home health  -established with neurology  -currently on Namenda, Risperdal, trazodone, and Aricept   -considering psychiatric features making referral to Psychiatry for co management and medication eval      Orders:  -     Ambulatory referral/consult to Home Health; Future; Expected date: 07/22/2025  -     Ambulatory referral/consult to Psychiatry; Future; Expected date: 07/28/2025  -     TSH; Future; Expected date: 07/21/2025  -     Lipid Panel; Future; Expected date: 07/21/2025  -     Comprehensive Metabolic Panel; Future; Expected date: 07/21/2025  -     CBC Auto Differential; Future; Expected date: 07/21/2025    2. Insomnia, unspecified type  Assessment & Plan:  -see Alzheimer's dementia diagnosis   -currently on trazodone find ineffective  -referral to Psychiatry    Orders:  -     Ambulatory referral/consult to Psychiatry; Future; Expected date: 07/28/2025  -     TSH; Future; Expected date: 07/21/2025  -     Lipid Panel; Future; Expected date: 07/21/2025  -     Comprehensive Metabolic Panel; Future; Expected date: 07/21/2025  -     CBC Auto Differential; Future; Expected date: 07/21/2025    3. Frail elderly  Assessment & Plan:  -considering age and medical comorbidities    Orders:  -     Ambulatory referral/consult to Home " Health; Future; Expected date: 07/22/2025    4. Hypothyroidism, unspecified type  Assessment & Plan:  -no recent TSH level   -referral to home health will add baseline labs with home health referral    Orders:  -     TSH; Future; Expected date: 07/21/2025  -     Lipid Panel; Future; Expected date: 07/21/2025  -     Comprehensive Metabolic Panel; Future; Expected date: 07/21/2025  -     CBC Auto Differential; Future; Expected date: 07/21/2025    5. Essential (primary) hypertension  Assessment & Plan:  -not currently on any antihypertensives   -allow permissive hypertension considering age    Orders:  -     Ambulatory referral/consult to Home Health; Future; Expected date: 07/22/2025  -     TSH; Future; Expected date: 07/21/2025  -     Lipid Panel; Future; Expected date: 07/21/2025  -     Comprehensive Metabolic Panel; Future; Expected date: 07/21/2025  -     CBC Auto Differential; Future; Expected date: 07/21/2025    6. Mixed hyperlipidemia  Assessment & Plan:  -currently on rosuvastatin 5 mg daily      Orders:  -     TSH; Future; Expected date: 07/21/2025  -     Lipid Panel; Future; Expected date: 07/21/2025  -     Comprehensive Metabolic Panel; Future; Expected date: 07/21/2025  -     CBC Auto Differential; Future; Expected date: 07/21/2025          Follow up for Previously scheduled and PRN if need.   -plan specifics discussed above    Orders Placed This Encounter    TSH    Lipid Panel    Comprehensive Metabolic Panel    CBC Auto Differential    Ambulatory referral/consult to Home Health    Ambulatory referral/consult to Psychiatry        Medication List with Changes/Refills   Current Medications    ASPIRIN (ECOTRIN) 81 MG EC TABLET    Take 81 mg by mouth once daily.    DONEPEZIL (ARICEPT) 5 MG TABLET        LATANOPROST 0.005 % OPHTHALMIC SOLUTION    Place 1 drop into both eyes every evening.    MELOXICAM (MOBIC) 15 MG TABLET    Take 15 mg by mouth.    MEMANTINE (NAMENDA) 5 MG TAB    Take 1 tablet (5 mg total) by  mouth 2 (two) times daily.    RISPERIDONE (RISPERDAL) 0.25 MG TAB    Take 0.5 mg by mouth 2 (two) times daily.    ROSUVASTATIN (CRESTOR) 5 MG TABLET    Take 5 mg by mouth once daily.    TRAZODONE (DESYREL) 100 MG TABLET    Take 100 mg by mouth every evening.   Discontinued Medications    MUPIROCIN (BACTROBAN) 2 % OINTMENT    SMARTSIG:Sparingly Topical 3 Times Daily          [1]   Social History  Tobacco Use    Smoking status: Former     Current packs/day: 0.00     Types: Cigarettes    Smokeless tobacco: Never   Substance Use Topics    Alcohol use: Yes    Drug use: Never

## 2025-07-22 ENCOUNTER — TELEPHONE (OUTPATIENT)
Dept: INTERNAL MEDICINE | Facility: CLINIC | Age: OVER 89
End: 2025-07-22
Payer: MEDICARE

## 2025-07-28 ENCOUNTER — TELEPHONE (OUTPATIENT)
Dept: INTERNAL MEDICINE | Facility: CLINIC | Age: OVER 89
End: 2025-07-28
Payer: MEDICARE

## 2025-07-28 NOTE — TELEPHONE ENCOUNTER
Copied from CRM #[id number]. Topic: General Inquiry - Patient Advice  >> Jul 28, 2025 11:13 AM Sharifa wrote:  Kike w/[facility] requesting a call back pt is requesting orders for home health assistance call back number [phone]  >> Jul 28, 2025  4:06 PM Med Assistant Fernanda wrote:

## 2025-07-31 ENCOUNTER — TELEPHONE (OUTPATIENT)
Dept: INTERNAL MEDICINE | Facility: CLINIC | Age: OVER 89
End: 2025-07-31
Payer: MEDICARE

## 2025-07-31 NOTE — TELEPHONE ENCOUNTER
Copied from CRM #[id number]. Topic: General Inquiry - Patient Advice  >> Jul 31, 2025 11:32 AM Anibal wrote:  Who Called: Kandy (home health nurse)    Caller is requesting assistance/information from provider's office.    Symptoms (please be specific): none   How long has patient had these symptoms:    List of preferred pharmacies on file (remove unneeded): [unfilled]  If different, enter pharmacy into here including location and phone number:       Preferred Method of Contact: Phone Call  Patient's Preferred Phone Number on File: [phone]   Best Call Back Number, if different:[phone] fax [phone] [facility]  Additional Information: Requesting a call back with verbal consent to draw labs on patient.  >> Jul 31, 2025  3:23 PM Med Assistant Fernanda wrote:

## 2025-08-04 ENCOUNTER — TELEPHONE (OUTPATIENT)
Dept: INTERNAL MEDICINE | Facility: CLINIC | Age: OVER 89
End: 2025-08-04
Payer: MEDICARE

## 2025-08-04 DIAGNOSIS — R30.0 DYSURIA: Primary | ICD-10-CM

## 2025-08-05 ENCOUNTER — TELEPHONE (OUTPATIENT)
Dept: INTERNAL MEDICINE | Facility: CLINIC | Age: OVER 89
End: 2025-08-05
Payer: MEDICARE

## 2025-08-05 DIAGNOSIS — E03.9 HYPOTHYROIDISM, UNSPECIFIED TYPE: Primary | Chronic | ICD-10-CM

## 2025-08-05 NOTE — TELEPHONE ENCOUNTER
Copied from CRM #[id number]. Topic: General Inquiry - Patient Advice  >> Aug 5, 2025  8:24 AM Lana wrote:  Who Called: Bertha[facility]    Caller is requesting assistance/information from provider's office.    Symptoms (please be specific): n/a   How long has patient had these symptoms:  n/a  List of preferred pharmacies on file (remove unneeded):n/a      Preferred Method of Contact: Phone Call  Patient's Preferred Phone Number on File: [phone]   Best Call Back Number, if different:[phone]  Additional Information: Caller stated that they received faxed papers but the top of papers are cut off, requesting for papers to be refaxed.      Fax: [phone]  >> Aug 5, 2025  9:27 AM Med Assistant Fernanda wrote:

## 2025-08-06 ENCOUNTER — TELEPHONE (OUTPATIENT)
Dept: INTERNAL MEDICINE | Facility: CLINIC | Age: OVER 89
End: 2025-08-06
Payer: MEDICARE

## 2025-08-06 DIAGNOSIS — E03.9 HYPOTHYROIDISM, UNSPECIFIED TYPE: Primary | Chronic | ICD-10-CM

## 2025-08-06 DIAGNOSIS — E03.9 HYPOTHYROIDISM, UNSPECIFIED TYPE: Chronic | ICD-10-CM

## 2025-08-06 LAB — TSH: 5.26

## 2025-08-06 RX ORDER — LEVOTHYROXINE SODIUM 25 UG/1
25 TABLET ORAL
Qty: 30 TABLET | Refills: 11 | Status: SHIPPED | OUTPATIENT
Start: 2025-08-06 | End: 2025-08-08 | Stop reason: SDUPTHER

## 2025-08-06 RX ORDER — LEVOTHYROXINE SODIUM 25 UG/1
25 TABLET ORAL
Qty: 30 TABLET | Refills: 11 | Status: SHIPPED | OUTPATIENT
Start: 2025-08-06 | End: 2025-08-06 | Stop reason: SDUPTHER

## 2025-08-06 NOTE — TELEPHONE ENCOUNTER
Spoke with daughter and she was given results and recommendations. Medication was re sent to [facility] pharmacy and repeat labs faxed to [facility].

## 2025-08-06 NOTE — TELEPHONE ENCOUNTER
-external thyroid 5.26 hypothyroidism   -patient does have history of hypothyroidism documented   -not currently on thyroid replacement   -initiate levothyroxine 25 mcg daily.

## 2025-08-08 ENCOUNTER — TELEPHONE (OUTPATIENT)
Dept: INTERNAL MEDICINE | Facility: CLINIC | Age: OVER 89
End: 2025-08-08
Payer: MEDICARE

## 2025-08-08 DIAGNOSIS — E03.9 HYPOTHYROIDISM, UNSPECIFIED TYPE: Chronic | ICD-10-CM

## 2025-08-08 RX ORDER — LEVOTHYROXINE SODIUM 25 UG/1
25 TABLET ORAL
Qty: 30 TABLET | Refills: 11 | Status: SHIPPED | OUTPATIENT
Start: 2025-08-08 | End: 2026-08-08

## 2025-08-14 ENCOUNTER — DOCUMENT SCAN (OUTPATIENT)
Dept: HOME HEALTH SERVICES | Facility: HOSPITAL | Age: OVER 89
End: 2025-08-14
Payer: MEDICARE

## 2025-08-14 ENCOUNTER — PATIENT MESSAGE (OUTPATIENT)
Dept: INTERNAL MEDICINE | Facility: CLINIC | Age: OVER 89
End: 2025-08-14
Payer: MEDICARE